# Patient Record
Sex: MALE | Race: WHITE | NOT HISPANIC OR LATINO | Employment: PART TIME | URBAN - METROPOLITAN AREA
[De-identification: names, ages, dates, MRNs, and addresses within clinical notes are randomized per-mention and may not be internally consistent; named-entity substitution may affect disease eponyms.]

---

## 2022-07-11 ENCOUNTER — ANCILLARY PROCEDURE (OUTPATIENT)
Dept: ULTRASOUND IMAGING | Facility: CLINIC | Age: 62
End: 2022-07-11
Attending: EMERGENCY MEDICINE
Payer: COMMERCIAL

## 2022-07-11 ENCOUNTER — HOSPITAL ENCOUNTER (EMERGENCY)
Facility: CLINIC | Age: 62
Discharge: HOME | End: 2022-07-11
Attending: EMERGENCY MEDICINE | Admitting: EMERGENCY MEDICINE
Payer: COMMERCIAL

## 2022-07-11 VITALS
TEMPERATURE: 98.6 F | OXYGEN SATURATION: 97 % | HEIGHT: 68 IN | BODY MASS INDEX: 30.43 KG/M2 | RESPIRATION RATE: 16 BRPM | HEART RATE: 60 BPM | DIASTOLIC BLOOD PRESSURE: 87 MMHG | SYSTOLIC BLOOD PRESSURE: 138 MMHG | WEIGHT: 200.8 LBS

## 2022-07-11 DIAGNOSIS — H53.9 VISION ABNORMALITIES: ICD-10-CM

## 2022-07-11 PROCEDURE — 99284 EMERGENCY DEPT VISIT MOD MDM: CPT | Mod: 25

## 2022-07-11 PROCEDURE — 76512 OPH US DX B-SCAN: CPT | Mod: LT

## 2022-07-11 ASSESSMENT — VISUAL ACUITY
OS: 20/25;WITH CORRECTIVE LENSES
OD: 20/20;WITH CORRECTIVE LENSES

## 2022-07-11 NOTE — ED NOTES
Gave patient [facility] business card with locations.  Also he is a [facility] patient and suggested that he call and see if they can fit her into their schedule.  sylvie

## 2022-07-11 NOTE — ED TRIAGE NOTES
Started seeing flashes, floaters in the left eye.  Intermittent, but is leaving out of state at 13:00 today.  Sxs started 14:00 Sunday  No pain, no history of migraine.  No nausea vomiting.     Triage Assessment     Row Name 07/11/22 0318       Triage Assessment (Adult)    Airway WDL WDL       Respiratory WDL    Respiratory WDL WDL       Skin Circulation/Temperature WDL    Skin Circulation/Temperature WDL WDL       Cardiac WDL    Cardiac WDL WDL       Peripheral/Neurovascular WDL    Peripheral Neurovascular WDL WDL       Cognitive/Neuro/Behavioral WDL    Cognitive/Neuro/Behavioral WDL WDL

## 2022-07-11 NOTE — ED PROVIDER NOTES
EMERGENCY DEPARTMENT ENCOUNTER      NAME: Hai Zamora  AGE: 61 year old male  YOB: 1960  MRN: [id number]  EVALUATION DATE & TIME: No admission date for patient encounter.    PCP: No primary care provider on file.    ED PROVIDER: Kiana Morgan M.D.      Chief Complaint   Patient presents with     Eye Problem     Flashes in eye, floaters since 1400 07/10/2022  Left eye         FINAL IMPRESSION:  1. Vision abnormalities          ED COURSE & MEDICAL DECISION MAKING:    ED Course as of 07/11/22 0439   Mon Jul 11, 2022   0332 Patient seen with atypical intermittent flashers/floaters in left eye today without loss of visual acuity, now resolved, as monocular phenomenon. No visual loss at any point suggestive of amaeurosis fugax, no temple pain to suggest GCA, and examination with both retinas visualized with nondilated eye examination possible in the ED without anomalies visualized although ED examination of the retina is certainly quite limited without formal scope / panoptic or cycloplegics available at this time or ophtalmology available. Patient with intact viisual acuity, no diplopia and bedside ultrasound performed with no signs of cloudy vitreal detachment and no visualized retinal detachment. No chemosis or redness present or FB sensation or eye pain, and EOMI and PERRLA which is all quite reassuring. As phenomenon is resolved, patient amenable to follow up with [facility] in the morning. Patient discharged after being provided with extensive anticipatory guidance and given return precautions, importance of PMD follow-up emphasized.      3:30 AM I met with the patient, obtained history, performed an initial exam, and discussed options and plan for diagnostics and treatment here in the ED. We discussed the plan for discharge and the patient is agreeable. Reviewed supportive cares, symptomatic treatment, outpatient follow up, and reasons to return to the Emergency Department. Patient to be  discharged by ED RN.     Pertinent Labs & Imaging studies reviewed. (See chart for details)    N95 worn  A face shield was worn also  COVID PPE      At the conclusion of the encounter I discussed the results of all of the tests and the disposition. The questions were answered. The patient or family acknowledged understanding and was agreeable with the care plan.     MEDICATIONS GIVEN IN THE EMERGENCY:  Medications - No data to display    NEW PRESCRIPTIONS STARTED AT TODAY'S ER VISIT  There are no discharge medications for this patient.         =================================================================    HPI  Hai Zamora is a 61 year old male with PMHx of hyperlipidemia who presents to the ED today via walk-in with visual disturbances.    Patient reports he started seeing floaters and flashes in his left eye yesterday around 1400. He states he gets these intermittently and reports that he last saw them prior to ED arrival while driving. Patient notes that earlier in the day yesterday, he noticed one floaters that was bright red but resolved after 30 minutes. Around 2000 last night, he noticed a flash that traveled from top to bottom of his eye, that was provoked with head movement. At present, he denies any vision changes. No prior history of this. Otherwise, he denies any numbness and headache. No nausea and vomiting. No prior history of migraines. No blood thinners. No other complaints at this time.    REVIEW OF SYSTEMS   All other systems reviewed and are negative except as noted above in HPI.    PAST MEDICAL HISTORY:  History reviewed. No pertinent past medical history.    PAST SURGICAL HISTORY:  History reviewed. No pertinent surgical history.    CURRENT MEDICATIONS:    No current outpatient medications on file.      ALLERGIES:  No Known Allergies    FAMILY HISTORY:  History reviewed. No pertinent family history.    SOCIAL HISTORY:   Social History     Socioeconomic History     Marital status:   "      VITALS:  Patient Vitals for the past 24 hrs:   BP Temp Temp src Pulse Resp SpO2 Height Weight   07/11/22 0345 138/87 -- -- 60 -- 97 % -- --   07/11/22 0317 (!) 159/87 98.6  F (37  C) Oral 61 16 96 % 1.727 m (5' 8\") 91.1 kg (200 lb 12.8 oz)       PHYSICAL EXAM    GENERAL: Awake, alert.  In no acute distress.   HEENT: Normocephalic, atraumatic.  Pupils equal, round and reactive.  Conjunctiva normal.  EOMI. right eye: retina normal appearing. left eye: retina normal appearing. Left visual field finger counting intact. No left temple pain.  NECK: No stridor or apparent deformity.  ABDOMINAL: Abdomen soft, non-distended and non-tender to palpation.  No CVAT, no palpable hepatosplenomegaly.  EXTREMITIES: No lower extremity swelling or edema.    NEURO: Alert and oriented to person, place and time.  Cranial nerves grossly intact.  No focal motor deficit.  PSYCH: Normal mood and affect  SKIN: No rashes       PROCEDURES:    PROCEDURE: Emergency Department Limited Bedside Screening Ultrasound of the Eye   INDICATIONS:  Left eye floaters    PROCEDURE PROVIDER:   Kiana Morgan    WINDOW: Left eye   FINDINGS: No retinal detachment visible on ultrasound   IMAGES SAVED AND STORED FOR ARCHIVE AND REVIEW: Yes       I, Skyla Chun, am serving as a scribe to document services personally performed by Dr. Kiana Morgan based on my observation and the provider's statements to me. I, Kiana Morgan MD attest that Skyla Chun is acting in a scribe capacity, has observed my performance of the services and has documented them in accordance with my direction.     Kiana Morgan MD  07/11/22 0439    "

## 2025-02-02 ENCOUNTER — HOSPITAL ENCOUNTER (EMERGENCY)
Facility: CLINIC | Age: 65
Discharge: HOME | End: 2025-02-02
Attending: EMERGENCY MEDICINE | Admitting: EMERGENCY MEDICINE
Payer: COMMERCIAL

## 2025-02-02 ENCOUNTER — APPOINTMENT (OUTPATIENT)
Dept: ULTRASOUND IMAGING | Facility: CLINIC | Age: 65
End: 2025-02-02
Attending: EMERGENCY MEDICINE
Payer: COMMERCIAL

## 2025-02-02 VITALS
WEIGHT: 195 LBS | HEART RATE: 64 BPM | DIASTOLIC BLOOD PRESSURE: 86 MMHG | HEIGHT: 68 IN | OXYGEN SATURATION: 98 % | BODY MASS INDEX: 29.55 KG/M2 | TEMPERATURE: 97.9 F | RESPIRATION RATE: 18 BRPM | SYSTOLIC BLOOD PRESSURE: 142 MMHG

## 2025-02-02 DIAGNOSIS — M17.11 PRIMARY OSTEOARTHRITIS OF RIGHT KNEE: ICD-10-CM

## 2025-02-02 DIAGNOSIS — M79.89 RIGHT LEG SWELLING: ICD-10-CM

## 2025-02-02 PROBLEM — E78.5 HYPERLIPIDEMIA: Status: ACTIVE | Noted: 2021-11-19

## 2025-02-02 PROCEDURE — 99284 EMERGENCY DEPT VISIT MOD MDM: CPT | Mod: 25

## 2025-02-02 PROCEDURE — 93971 EXTREMITY STUDY: CPT | Mod: RT

## 2025-02-02 ASSESSMENT — ACTIVITIES OF DAILY LIVING (ADL): ADLS_ACUITY_SCORE: 41

## 2025-02-02 ASSESSMENT — COLUMBIA-SUICIDE SEVERITY RATING SCALE - C-SSRS
2. HAVE YOU ACTUALLY HAD ANY THOUGHTS OF KILLING YOURSELF IN THE PAST MONTH?: NO
1. IN THE PAST MONTH, HAVE YOU WISHED YOU WERE DEAD OR WISHED YOU COULD GO TO SLEEP AND NOT WAKE UP?: NO
6. HAVE YOU EVER DONE ANYTHING, STARTED TO DO ANYTHING, OR PREPARED TO DO ANYTHING TO END YOUR LIFE?: NO

## 2025-02-03 NOTE — ED PROVIDER NOTES
EMERGENCY DEPARTMENT ENCOUNTER      NAME: Hai Zamora  AGE: 64 year old male  YOB: 1960  MRN: [id number]  EVALUATION DATE & TIME: No admission date for patient encounter.    PCP: Dacia Edmond    ED PROVIDER: Chela Solis DO      Chief Complaint   Patient presents with    Leg Swelling         FINAL IMPRESSION:  1. Right leg swelling    2. Primary osteoarthritis of right knee          ED COURSE & MEDICAL DECISION MAKING:    Pertinent Labs & Imaging studies reviewed. (See chart for details)  8:59 PM I met the patient and performed my initial interview and exam.  10:08 PM I rechecked and updated the patient on results. We discussed the plan for discharge and the patient is agreeable. Reviewed supportive cares, symptomatic treatment, outpatient follow up, and reasons to return to the Emergency Department. All questions and concerns were addressed. Patient to be discharged by ED RN.     64 year old male presents to the Emergency Department for evaluation of right lower extremity swelling.  Patient noticed this few days ago.  Has known arthritis of bilateral knees.  Lifts daily.  Wears knee sleeves while doing this.  Denies any known injury.  Took off his sleep to see if that would help.  No significant pain.  Given the swelling presents for further evaluation.  He is ambulatory without difficulty.  He is intact pulses.  He does have edema of the right leg greater than the left.  No erythema to suggest infection.  Not consistent with compartment syndrome.  Venous duplex obtained to rule out evidence of clot and this was negative.  Discussed with patient.  Certainly given his degree of arthritis likely a secondary reaction from this, possibly tweaked something while lifting.  We discussed elevation, continue compression.  Encourage close follow-up with orthopedics if ongoing symptoms then should consider repeat ultrasound if significant swelling persistent in 1 week.    At the conclusion of the  encounter I discussed the results of all of the tests and the disposition. The questions were answered. The patient or family acknowledged understanding and was agreeable with the care plan.     Medical Decision Making  Obtained supplemental history:Supplemental history obtained?: No  Reviewed external records: External records reviewed?: Documented in chart and Outpatient Record: [facility] on 1/6/2025  Care impacted by chronic illness:Hyperlipidemia  Did you consider but not order tests?: Work up considered but not performed and documented in chart, if applicable  Did you interpret images independently?: Independent interpretation of ECG and images noted in documentation, when applicable.  Consultation discussion with other provider:Did you involve another provider (consultant, , pharmacy, etc.)?: No  Discharge. No recommendations on prescription strength medication(s). See documentation for any additional details.    MIPS: Not Applicable    MEDICATIONS GIVEN IN THE EMERGENCY:  Medications - No data to display    NEW PRESCRIPTIONS STARTED AT TODAY'S ER VISIT  There are no discharge medications for this patient.         =================================================================    HPI    Patient information was obtained from: the patient     Use of : N/A         Hai Zamora is a 64 year old male with a pertinent history of HLD who presents to this ED by walking in for evaluation of leg swelling.     The patient reports worsening swelling of his right lower extremity since 3 days ago. He wears a leg brace for knee arthritis but removed to reduce the swelling, no relief. This morning he was lifting. Denies any injuries prior. He denies chest pain and shortness of breath.     The patient denies any other complaints at this time.     Per chart review, the patient presented to [facility] on 1/6/2025. History of primary osteoarthritis of right knee. Xrays  "showed severe degenerative changes with complete loss of joint space medially of the left knee. Discussed treatment options, patient opted to continue with conservative treatment measures and would like to try symptom monitoring.     REVIEW OF SYSTEMS   Per HPI    PAST MEDICAL HISTORY:  History reviewed. No pertinent past medical history.    PAST SURGICAL HISTORY:  History reviewed. No pertinent surgical history.        CURRENT MEDICATIONS:    No current outpatient medications on file.       ALLERGIES:  No Known Allergies    FAMILY HISTORY:  History reviewed. No pertinent family history.    SOCIAL HISTORY:   Social History     Socioeconomic History    Marital status:      Spouse name: None    Number of children: None    Years of education: None    Highest education level: None     Social Drivers of Health     Financial Resource Strain: Not At Risk (1/19/2024)    Received from [employer]    Financial Resource Strain     Is it hard for you to pay for the very basics like food, housing, medical care or heating?: No   Food Insecurity: Not At Risk (1/19/2024)    Received from [employer]    Food Insecurity     Does your food run out before you have the money to buy more?: No   Transportation Needs: Not At Risk (1/19/2024)    Received from [employer]    Transportation Needs     Does a lack of transportation keep you from your medical appointments or from getting your medications?: No       VITALS:  BP (!) 142/86   Pulse 64   Temp 97.9  F (36.6  C)   Resp 18   Ht 1.727 m (5' 8\")   Wt 88.5 kg (195 lb)   SpO2 98%   BMI 29.65 kg/m      PHYSICAL EXAM    Physical Exam  Vitals and nursing note reviewed.   Constitutional:       General: He is not in acute distress.     Appearance: Normal appearance.   HENT:      Head: Normocephalic and atraumatic.   Eyes:      Pupils: Pupils are equal, round, and reactive to light.   Cardiovascular:      Rate and Rhythm: Normal rate and regular rhythm.      Pulses:        "    Dorsalis pedis pulses are 2+ on the right side and 2+ on the left side.   Pulmonary:      Effort: Pulmonary effort is normal.   Abdominal:      General: Abdomen is flat.   Musculoskeletal:         General: Normal range of motion.      Right knee: Normal.      Right lower leg: No tenderness. Edema present.   Skin:     General: Skin is warm and dry.   Neurological:      General: No focal deficit present.      Mental Status: He is alert.      Gait: Gait normal.         LAB:  All pertinent labs reviewed and interpreted.  Labs Ordered and Resulted from Time of ED Arrival to Time of ED Departure - No data to display    RADIOLOGY:  Reviewed all pertinent imaging. Please see official radiology report.  US Lower Extremity Venous Duplex Right   Final Result   IMPRESSION:   1.  No deep venous thrombosis in the right lower extremity.          I, Tejas Lundberg, am serving as a scribe to document services personally performed by Dr. Chela Solis based on my observation and the provider's statements to me. Chela TANNER DO attest that Tejas Lundberg is acting in a scribe capacity, has observed my performance of the services and has documented them in accordance with my direction.    Chela Solis DO  Emergency Medicine  [facility] EMERGENCY ROOM  [address] [id number] [phone]  Dept: [phone]       Chela Solis DO  02/02/25 7672

## 2025-02-03 NOTE — ED TRIAGE NOTES
PT is coming in tonight with RT leg swelling. PT was working out this morning wearing his leg brace. He took the brace off and was hoping that through out the day the swelling would improve but is has not.      Triage Assessment (Adult)       Row Name 02/02/25 2053          Triage Assessment    Airway WDL WDL        Respiratory WDL    Respiratory WDL WDL        Skin Circulation/Temperature WDL    Skin Circulation/Temperature WDL WDL        Cardiac WDL    Cardiac WDL WDL        Peripheral/Neurovascular WDL    Peripheral Neurovascular WDL WDL        Cognitive/Neuro/Behavioral WDL    Cognitive/Neuro/Behavioral WDL WDL

## 2025-02-03 NOTE — DISCHARGE INSTRUCTIONS
No signs of blood clot  Likely secondary to the arthritis in your knee  I would continue the knee sleeve, elevation  Follow-up closely with orthopedics as needed  I persistent swelling or calf pain past one week I would recommend a repeat ultrasound

## 2025-07-27 ENCOUNTER — HOSPITAL ENCOUNTER (EMERGENCY)
Facility: CLINIC | Age: 65
Discharge: HOME | End: 2025-07-27
Attending: EMERGENCY MEDICINE | Admitting: EMERGENCY MEDICINE
Payer: COMMERCIAL

## 2025-07-27 ENCOUNTER — APPOINTMENT (OUTPATIENT)
Dept: ULTRASOUND IMAGING | Facility: CLINIC | Age: 65
End: 2025-07-27
Attending: EMERGENCY MEDICINE
Payer: COMMERCIAL

## 2025-07-27 VITALS
TEMPERATURE: 97.9 F | HEART RATE: 63 BPM | DIASTOLIC BLOOD PRESSURE: 78 MMHG | BODY MASS INDEX: 29.22 KG/M2 | WEIGHT: 192.8 LBS | OXYGEN SATURATION: 96 % | SYSTOLIC BLOOD PRESSURE: 143 MMHG | HEIGHT: 68 IN | RESPIRATION RATE: 20 BRPM

## 2025-07-27 DIAGNOSIS — M96.842 POSTOPERATIVE SEROMA OF MUSCULOSKELETAL STRUCTURE AFTER MUSCULOSKELETAL PROCEDURE: Primary | ICD-10-CM

## 2025-07-27 PROCEDURE — 99284 EMERGENCY DEPT VISIT MOD MDM: CPT | Mod: 25 | Performed by: EMERGENCY MEDICINE

## 2025-07-27 PROCEDURE — 93971 EXTREMITY STUDY: CPT | Mod: LT

## 2025-07-27 ASSESSMENT — ACTIVITIES OF DAILY LIVING (ADL)
ADLS_ACUITY_SCORE: 41
ADLS_ACUITY_SCORE: 41

## 2025-07-27 NOTE — ED PROVIDER NOTES
EMERGENCY DEPARTMENT ENCOUNTER      NAME: Hai Zamora  AGE: 64 year old male  YOB: 1960  MRN: [id number]  EVALUATION DATE & TIME: 7/27/2025  3:32 AM    PCP: Dacia Edmond    ED PROVIDER: Oj Linda M.D.      Chief Complaint   Patient presents with    Ankle Pain     Left knee surgery 6 1/2 weeks ago now experiencing left ankle pain           FINAL IMPRESSION:  1. Postoperative seroma of musculoskeletal structure after musculoskeletal procedure          ED COURSE & MEDICAL DECISION MAKING:    Pertinent Labs & Imaging studies reviewed. (See chart for details)  64 year old male presents to the Emergency Department for evaluation of left ankle pain and concern for it giving out on him.  He is 6 and half weeks status post left total knee replacement.  On examination there is some swelling of the knee but good range of motion.  No signs of infection.  Distal pulses are intact.  Strength is intact throughout the foot.  No bony tenderness.  I do not suspect any kind of bony abnormality of his ankle.  Seems less likely ankle sprain as there is no trauma.  May be some degree of deconditioning and weakness as he has been using a cane mostly.  Did consider DVT.  Ultrasound is done.  No DVT but there is a 5 cm area of fluid collection medial to the knee.  This appears to be a seroma based on my exam.  No warmth or heat making me think abscess.  Does not appear to be hematoma.  I do think he safe for discharge home.  Continue ice elevate and use compression.  He will call his surgeon on Monday.  Return for worsening symptoms    3:40 AM I met with the patient to gather history and to perform my initial exam. I discussed the plan for care while in the Emergency Department.       At the conclusion of the encounter I discussed the results of all of the tests and the disposition. The questions were answered. The patient or family acknowledged understanding and was agreeable with the care plan.     Medical  "Decision Making    Discharge. No recommendations on prescription strength medication(s). See documentation for any additional details.    MIPS:  Not Applicable    SEPSIS: None             MEDICATIONS GIVEN IN THE EMERGENCY:  Medications - No data to display    NEW PRESCRIPTIONS STARTED AT TODAY'S ER VISIT  New Prescriptions    No medications on file          =================================================================    HPI    Patient information was obtained from: patient and wife    Use of : N/A         Hai Zamora is a 64 year old male with a pertinent history of hyperlipidemia, and primary osteoarthritis of left knee, who presents to this ED for evaluation of foot pain.    Patient reports he had knee replacement surgery roughly 6 weeks ago. He states that was healing good and he was able to get off the cane 1.5 weeks ago. He states last Friday (7/18/25) he noticed his left feet becoming a little sore. He states the soreness would get better and then come back after a few hours. He states last night he felt good when doing the exercises. No pain.He went to bed at 12 AM. He woke up at 1 AM to use the bathroom and the foot was very painful. The pain shoots up to the knee. He was unable to stand on it. He went back to bed and had to use the bathroom again at 3 AM. The pain was even worse and he decided to be seen. Patient states \"it feel like my ankle is going to give out.\" Patient was limping while walking to the ED room. Patient does have an appointment with Podiatry on Tuesday (7/29/25). Patient took 400 mg of ibuprofen at 5:30 PM and 200 mg of ibuprofen at 1 AM.     Patient denies fevers, chills, or any other complaints at this time.      Per chart review, patient was seen on 6/23/25 at [facility] for postop check after s/p left knee replacement. Continue PT and home exercises, use the oxycodone 30-45 min prior to PT and home exercise for more optimal pain management " "while working on knee bending and straightening Continue icing and elevating, 4x/day at a minimum but more is beneficial. Increase walking distance and speed as tolerated. Continue on 162 mg Aspirin for 4 more weeks.         PAST MEDICAL HISTORY:  No past medical history on file.    PAST SURGICAL HISTORY:  No past surgical history on file.        CURRENT MEDICATIONS:    No current facility-administered medications for this encounter.     No current outpatient medications on file.         ALLERGIES:  No Known Allergies    FAMILY HISTORY:  No family history on file.    SOCIAL HISTORY:   Social History     Socioeconomic History    Marital status:      Social Drivers of Health     Financial Resource Strain: Not At Risk (1/19/2024)    Received from [employer]    Financial Resource Strain     Is it hard for you to pay for the very basics like food, housing, medical care or heating?: No   Food Insecurity: Not At Risk (1/19/2024)    Received from [employer]    Food Insecurity     Does your food run out before you have the money to buy more?: No   Transportation Needs: Not At Risk (1/19/2024)    Received from [employer]    Transportation Needs     Does a lack of transportation keep you from your medical appointments or from getting your medications?: No       VITALS:  BP (!) 152/78   Pulse 68   Temp 97.9  F (36.6  C) (Oral)   Resp 18   Ht 1.727 m (5' 8\")   Wt 87.5 kg (192 lb 12.8 oz)   SpO2 97%   BMI 29.32 kg/m      PHYSICAL EXAM    Physical Exam  Constitutional:       General: He is not in acute distress.     Appearance: Normal appearance. He is not toxic-appearing.   HENT:      Head: Atraumatic.   Eyes:      General: No scleral icterus.     Conjunctiva/sclera: Conjunctivae normal.   Cardiovascular:      Rate and Rhythm: Normal rate.      Heart sounds: Normal heart sounds.   Pulmonary:      Effort: Pulmonary effort is normal. No respiratory distress.      Breath sounds: Normal breath sounds. "   Abdominal:      Palpations: Abdomen is soft.      Tenderness: There is no abdominal tenderness.   Musculoskeletal:         General: No deformity.      Cervical back: Neck supple.      Comments: Left knee does have some swelling.  Good range of motion.  It is not warm.  Surgical scar is clean dry and intact.  Distal leg and foot are is not swollen.  Pulses are intact.  Strength intact throughout the left lower extremity.  Sensation intact.   Skin:     General: Skin is warm.   Neurological:      Mental Status: He is alert.           LAB:  All pertinent labs reviewed and interpreted.  Labs Ordered and Resulted from Time of ED Arrival to Time of ED Departure - No data to display    RADIOLOGY:  Reviewed all pertinent imaging. Please see official radiology report.  US Lower Extremity Venous Duplex Left   Final Result   IMPRESSION:   1.  No deep venous thrombosis in the left lower extremity.   2.  Roughly 5 cm deep soft tissue fluid collection in the medial left knee region; abscess, seroma, or hematoma included in the DDX.            I, David Weldon, am serving as a scribe to document services personally performed by Dr. Oj Linda, based on my observation and the provider's statements to me. I, Oj Linda MD attest that David Weldon is acting in a scribe capacity, has observed my performance of the services and has documented them in accordance with my direction.    Oj Linda M.D.  Emergency Medicine  [facility] EMERGENCY ROOM  [address] [id number] [phone]  Dept: [phone]       Oj Linda MD  07/27/25 0458

## 2025-07-27 NOTE — ED NOTES
Discharge Instructions     Discharge disposition:  Discharged to home                           Follow-up:  Follow up with     Call Beka Flynn MD in 1 day (around 7/28/2025)  Specialty: Orthopaedic Surgery  Contact:  [address] [place] [address] [phone]       Additional instructions: None        Able to teach back assisted to the car. Pain controlled when in bed.

## 2025-07-27 NOTE — ED TRIAGE NOTES
Had knee surgery 6 1/2 weeks ago, now experiencing left ankle pain which is 4/10 when resting pain level is 0/10. Pain with ambulation noted changed     Knee edema, great pulse noted good strength noted, left leg weakness in strength compare to right leg. Great color and circulation note.     Took one tab of ibuprofen 7/27/2025 around 0100. Pain controlled on rest 0/10. Knee surgery was done at [facility] [phone] 6 1/2 weeks ago.      Triage Assessment (Adult)       Row Name 07/27/25 0342          Triage Assessment    Airway WDL WDL        Respiratory WDL    Respiratory WDL WDL        Skin Circulation/Temperature WDL    Skin Circulation/Temperature WDL WDL        Cardiac WDL    Cardiac WDL WDL        Peripheral/Neurovascular WDL    Peripheral Neurovascular WDL WDL        Cognitive/Neuro/Behavioral WDL    Cognitive/Neuro/Behavioral WDL WDL